# Patient Record
Sex: MALE | Race: WHITE | Employment: OTHER | URBAN - METROPOLITAN AREA
[De-identification: names, ages, dates, MRNs, and addresses within clinical notes are randomized per-mention and may not be internally consistent; named-entity substitution may affect disease eponyms.]

---

## 2017-08-30 ENCOUNTER — HOSPITAL ENCOUNTER (OUTPATIENT)
Dept: LAB | Facility: HOSPITAL | Age: 67
Discharge: HOME | End: 2017-08-30
Attending: INTERNAL MEDICINE
Payer: MEDICARE

## 2017-08-30 DIAGNOSIS — E78.5 HYPERLIPIDEMIA LDL GOAL <70: ICD-10-CM

## 2017-08-30 DIAGNOSIS — I25.10 CORONARY ARTERY DISEASE INVOLVING NATIVE CORONARY ARTERY OF NATIVE HEART WITHOUT ANGINA PECTORIS: ICD-10-CM

## 2017-08-30 LAB
ALBUMIN SERPL-MCNC: 4 G/DL (ref 3.5–4.8)
ALP LIVER SERPL-CCNC: 87 U/L (ref 45–117)
ALT SERPL-CCNC: 29 U/L (ref 17–63)
AST SERPL-CCNC: 15 U/L (ref 15–41)
BILIRUB DIRECT SERPL-MCNC: 0.2 MG/DL (ref 0.1–0.5)
BILIRUB SERPL-MCNC: 0.7 MG/DL (ref 0.1–2)
CHOLEST SMN-MCNC: 108 MG/DL (ref ?–200)
HDLC SERPL-MCNC: 54 MG/DL (ref 45–?)
HDLC SERPL: 2 {RATIO} (ref ?–4.97)
LDLC SERPL CALC-MCNC: 31 MG/DL (ref ?–130)
LDLC SERPL-MCNC: 23 MG/DL (ref 5–40)
M PROTEIN MFR SERPL ELPH: 7.6 G/DL (ref 6.1–8.3)
NONHDLC SERPL-MCNC: 54 MG/DL (ref ?–130)
TRIGLYCERIDES: 113 MG/DL (ref ?–150)

## 2017-08-30 PROCEDURE — 80076 HEPATIC FUNCTION PANEL: CPT | Performed by: INTERNAL MEDICINE

## 2017-08-30 PROCEDURE — 36415 COLL VENOUS BLD VENIPUNCTURE: CPT | Performed by: INTERNAL MEDICINE

## 2017-08-30 PROCEDURE — 80061 LIPID PANEL: CPT | Performed by: INTERNAL MEDICINE

## 2017-12-11 ENCOUNTER — HOSPITAL ENCOUNTER (OUTPATIENT)
Dept: LAB | Facility: HOSPITAL | Age: 67
Discharge: HOME | End: 2017-12-11
Attending: INTERNAL MEDICINE
Payer: MEDICARE

## 2017-12-11 DIAGNOSIS — Z95.5 S/P CORONARY ARTERY STENT PLACEMENT: ICD-10-CM

## 2017-12-11 DIAGNOSIS — I25.10 CORONARY ARTERY DISEASE INVOLVING NATIVE CORONARY ARTERY OF NATIVE HEART WITHOUT ANGINA PECTORIS: ICD-10-CM

## 2017-12-11 DIAGNOSIS — E78.5 HYPERLIPIDEMIA LDL GOAL <70: ICD-10-CM

## 2017-12-11 PROCEDURE — 80061 LIPID PANEL: CPT

## 2017-12-11 PROCEDURE — 80076 HEPATIC FUNCTION PANEL: CPT

## 2017-12-11 PROCEDURE — 36415 COLL VENOUS BLD VENIPUNCTURE: CPT

## 2018-05-18 ENCOUNTER — HOSPITAL ENCOUNTER (OUTPATIENT)
Dept: LAB | Facility: HOSPITAL | Age: 68
Discharge: HOME | End: 2018-05-18
Attending: ANESTHESIOLOGY
Payer: MEDICARE

## 2018-05-18 DIAGNOSIS — Z95.5 S/P CORONARY ARTERY STENT PLACEMENT: ICD-10-CM

## 2018-05-18 DIAGNOSIS — I25.10 CORONARY ARTERY DISEASE INVOLVING NATIVE CORONARY ARTERY OF NATIVE HEART WITHOUT ANGINA PECTORIS: ICD-10-CM

## 2018-05-18 DIAGNOSIS — E78.5 HYPERLIPIDEMIA LDL GOAL <70: ICD-10-CM

## 2018-05-18 PROCEDURE — 80061 LIPID PANEL: CPT

## 2018-05-18 PROCEDURE — 80076 HEPATIC FUNCTION PANEL: CPT

## 2018-05-18 PROCEDURE — 36415 COLL VENOUS BLD VENIPUNCTURE: CPT

## 2018-07-20 PROBLEM — I10 ESSENTIAL HYPERTENSION: Status: ACTIVE | Noted: 2018-07-20

## 2018-10-21 ENCOUNTER — HOSPITAL ENCOUNTER (EMERGENCY)
Facility: HOSPITAL | Age: 68
Discharge: HOME | End: 2018-10-21
Attending: EMERGENCY MEDICINE
Payer: MEDICARE

## 2018-10-21 ENCOUNTER — APPOINTMENT (OUTPATIENT)
Dept: GENERAL RADIOLOGY | Facility: HOSPITAL | Age: 68
End: 2018-10-21
Attending: EMERGENCY MEDICINE
Payer: MEDICARE

## 2018-10-21 VITALS
RESPIRATION RATE: 18 BRPM | TEMPERATURE: 99 F | SYSTOLIC BLOOD PRESSURE: 153 MMHG | HEIGHT: 70 IN | DIASTOLIC BLOOD PRESSURE: 97 MMHG | HEART RATE: 80 BPM | WEIGHT: 195 LBS | BODY MASS INDEX: 27.92 KG/M2 | OXYGEN SATURATION: 95 %

## 2018-10-21 DIAGNOSIS — R04.0 EPISTAXIS: Primary | ICD-10-CM

## 2018-10-21 PROCEDURE — 36415 COLL VENOUS BLD VENIPUNCTURE: CPT

## 2018-10-21 PROCEDURE — 86900 BLOOD TYPING SEROLOGIC ABO: CPT | Performed by: EMERGENCY MEDICINE

## 2018-10-21 PROCEDURE — 85025 COMPLETE CBC W/AUTO DIFF WBC: CPT | Performed by: EMERGENCY MEDICINE

## 2018-10-21 PROCEDURE — 99284 EMERGENCY DEPT VISIT MOD MDM: CPT

## 2018-10-21 PROCEDURE — 71045 X-RAY EXAM CHEST 1 VIEW: CPT | Performed by: EMERGENCY MEDICINE

## 2018-10-21 PROCEDURE — 86901 BLOOD TYPING SEROLOGIC RH(D): CPT | Performed by: EMERGENCY MEDICINE

## 2018-10-21 PROCEDURE — 85610 PROTHROMBIN TIME: CPT | Performed by: EMERGENCY MEDICINE

## 2018-10-21 PROCEDURE — 85730 THROMBOPLASTIN TIME PARTIAL: CPT | Performed by: EMERGENCY MEDICINE

## 2018-10-21 PROCEDURE — 99283 EMERGENCY DEPT VISIT LOW MDM: CPT

## 2018-10-21 PROCEDURE — 80053 COMPREHEN METABOLIC PANEL: CPT | Performed by: EMERGENCY MEDICINE

## 2018-10-21 PROCEDURE — 86850 RBC ANTIBODY SCREEN: CPT | Performed by: EMERGENCY MEDICINE

## 2018-10-21 PROCEDURE — 82272 OCCULT BLD FECES 1-3 TESTS: CPT

## 2018-10-21 NOTE — ED INITIAL ASSESSMENT (HPI)
Pt here with c/o bloody emesis X 3 episodes, patient states having throat pain and discomfort swallowing after having a dislodged piece of turkey, after the first episode of bloody emesis.  Patient breathing unlabored, even, no acute respiratory distress no

## 2018-10-22 NOTE — ED PROVIDER NOTES
Patient Seen in: [facility] Emergency Department    History   Patient presents with:  Bleeding (hematologic)  Nausea/Vomiting/Diarrhea (gastrointestinal)    Stated Complaint: hematemesis    HPI    Patient is a 75-year-old male presents to emergency Pulse 80   Temp 98.8 °F (37.1 °C) (Temporal)   Resp 18   Ht 177.8 cm (5' 10\")   Wt 88.5 kg   SpO2 95%   BMI 27.98 kg/m²         Physical Exam    GENERAL: No acute distress, well appearing and non-toxic, Alert and oriented X 3   HEENT: Normocephalic, atr Abnormality         Status                     ---------                               -----------         ------                     CBC W/ DIFFERENTIAL[[id number]]          Abnormal            Final result                 Please view results fo MDM   Patient is a 58-year-old male comes emergency room for evaluation of spitting up blood. Patient x-ray shows no acute pulmonary process. No masses. Hemoglobin 17 actually hemoconcentrated. Creatinine slightly elevated.   Recommend he push f

## 2018-10-31 ENCOUNTER — ANESTHESIA EVENT (OUTPATIENT)
Dept: SURGERY | Facility: HOSPITAL | Age: 68
End: 2018-10-31
Payer: MEDICARE

## 2018-10-31 ENCOUNTER — ANESTHESIA (OUTPATIENT)
Dept: SURGERY | Facility: HOSPITAL | Age: 68
End: 2018-10-31
Payer: MEDICARE

## 2018-10-31 ENCOUNTER — HOSPITAL ENCOUNTER (OUTPATIENT)
Facility: HOSPITAL | Age: 68
Setting detail: HOSPITAL OUTPATIENT SURGERY
Discharge: HOME | End: 2018-10-31
Attending: OTOLARYNGOLOGY | Admitting: OTOLARYNGOLOGY
Payer: MEDICARE

## 2018-10-31 VITALS
RESPIRATION RATE: 18 BRPM | DIASTOLIC BLOOD PRESSURE: 77 MMHG | HEART RATE: 52 BPM | SYSTOLIC BLOOD PRESSURE: 113 MMHG | WEIGHT: 192 LBS | OXYGEN SATURATION: 100 % | BODY MASS INDEX: 27.49 KG/M2 | HEIGHT: 70 IN | TEMPERATURE: 98 F

## 2018-10-31 DIAGNOSIS — K14.8 TONGUE MASS: ICD-10-CM

## 2018-10-31 PROCEDURE — 0CBM8ZX EXCISION OF PHARYNX, VIA NATURAL OR ARTIFICIAL OPENING ENDOSCOPIC, DIAGNOSTIC: ICD-10-PCS | Performed by: OTOLARYNGOLOGY

## 2018-10-31 PROCEDURE — 88342 IMHCHEM/IMCYTCHM 1ST ANTB: CPT | Performed by: OTOLARYNGOLOGY

## 2018-10-31 PROCEDURE — 88305 TISSUE EXAM BY PATHOLOGIST: CPT | Performed by: OTOLARYNGOLOGY

## 2018-10-31 RX ORDER — ACETAMINOPHEN 500 MG
1000 TABLET ORAL ONCE
Status: DISCONTINUED | OUTPATIENT
Start: 2018-10-31 | End: 2018-10-31 | Stop reason: HOSPADM

## 2018-10-31 RX ORDER — NALOXONE HYDROCHLORIDE 0.4 MG/ML
80 INJECTION, SOLUTION INTRAMUSCULAR; INTRAVENOUS; SUBCUTANEOUS AS NEEDED
Status: DISCONTINUED | OUTPATIENT
Start: 2018-10-31 | End: 2018-10-31

## 2018-10-31 RX ORDER — LIDOCAINE HYDROCHLORIDE AND EPINEPHRINE 10; 10 MG/ML; UG/ML
INJECTION, SOLUTION INFILTRATION; PERINEURAL AS NEEDED
Status: DISCONTINUED | OUTPATIENT
Start: 2018-10-31 | End: 2018-10-31 | Stop reason: HOSPADM

## 2018-10-31 RX ORDER — MIDAZOLAM HYDROCHLORIDE 1 MG/ML
1 INJECTION INTRAMUSCULAR; INTRAVENOUS EVERY 5 MIN PRN
Status: DISCONTINUED | OUTPATIENT
Start: 2018-10-31 | End: 2018-10-31

## 2018-10-31 RX ORDER — SODIUM CHLORIDE, SODIUM LACTATE, POTASSIUM CHLORIDE, CALCIUM CHLORIDE 600; 310; 30; 20 MG/100ML; MG/100ML; MG/100ML; MG/100ML
INJECTION, SOLUTION INTRAVENOUS CONTINUOUS
Status: DISCONTINUED | OUTPATIENT
Start: 2018-10-31 | End: 2018-10-31

## 2018-10-31 RX ORDER — ACETAMINOPHEN AND CODEINE PHOSPHATE 300; 30 MG/1; MG/1
1-2 TABLET ORAL EVERY 4 HOURS PRN
Qty: 25 TABLET | Refills: 0 | Status: SHIPPED | OUTPATIENT
Start: 2018-10-31 | End: 2018-11-10

## 2018-10-31 RX ORDER — MORPHINE SULFATE 4 MG/ML
2 INJECTION, SOLUTION INTRAMUSCULAR; INTRAVENOUS EVERY 5 MIN PRN
Status: DISCONTINUED | OUTPATIENT
Start: 2018-10-31 | End: 2018-10-31

## 2018-10-31 RX ORDER — HYDROCODONE BITARTRATE AND ACETAMINOPHEN 5; 325 MG/1; MG/1
2 TABLET ORAL AS NEEDED
Status: DISCONTINUED | OUTPATIENT
Start: 2018-10-31 | End: 2018-10-31

## 2018-10-31 RX ORDER — CEFAZOLIN SODIUM/WATER 2 G/20 ML
2 SYRINGE (ML) INTRAVENOUS ONCE
Status: DISCONTINUED | OUTPATIENT
Start: 2018-10-31 | End: 2018-10-31 | Stop reason: HOSPADM

## 2018-10-31 RX ORDER — DEXAMETHASONE SODIUM PHOSPHATE 4 MG/ML
8 VIAL (ML) INJECTION AS NEEDED
Status: DISCONTINUED | OUTPATIENT
Start: 2018-10-31 | End: 2018-10-31

## 2018-10-31 RX ORDER — ACETAMINOPHEN 500 MG
1000 TABLET ORAL ONCE
COMMUNITY
End: 2018-11-13 | Stop reason: ALTCHOICE

## 2018-10-31 RX ORDER — ONDANSETRON 2 MG/ML
4 INJECTION INTRAMUSCULAR; INTRAVENOUS AS NEEDED
Status: DISCONTINUED | OUTPATIENT
Start: 2018-10-31 | End: 2018-10-31

## 2018-10-31 RX ORDER — METOCLOPRAMIDE HYDROCHLORIDE 5 MG/ML
10 INJECTION INTRAMUSCULAR; INTRAVENOUS AS NEEDED
Status: DISCONTINUED | OUTPATIENT
Start: 2018-10-31 | End: 2018-10-31

## 2018-10-31 RX ORDER — HYDROCODONE BITARTRATE AND ACETAMINOPHEN 5; 325 MG/1; MG/1
1 TABLET ORAL AS NEEDED
Status: DISCONTINUED | OUTPATIENT
Start: 2018-10-31 | End: 2018-10-31

## 2018-10-31 RX ORDER — MEPERIDINE HYDROCHLORIDE 25 MG/ML
12.5 INJECTION INTRAMUSCULAR; INTRAVENOUS; SUBCUTANEOUS AS NEEDED
Status: DISCONTINUED | OUTPATIENT
Start: 2018-10-31 | End: 2018-10-31

## 2018-10-31 NOTE — ANESTHESIA POSTPROCEDURE EVALUATION
[place]  Patient Status:  Hospital Outpatient Surgery   Age/Gender 79year old male MRN [id number]   [facility] SURGERY Attending Che Mares MD   Hosp Day # 0 PCP Vitor Lobato MD       Anesthesia Pos

## 2018-10-31 NOTE — OPERATIVE REPORT
[facility]    PATIENT'S NAME: Valerio Cummings   ATTENDING PHYSICIAN: Keshia Jay M.D. OPERATING PHYSICIAN: Keshia Jay M.D.    PATIENT ACCOUNT#:   [de-identified]    LOCATION:  PACU 1404 [facility] PACU 4 EDWP 10  MEDICAL RECORD #:   [id number]       DATE OF HERNANDEZ

## 2018-10-31 NOTE — ANESTHESIA PREPROCEDURE EVALUATION
PRE-OP EVALUATION    Patient Name: Alvera Factor.    Pre-op Diagnosis: Tongue mass [R22.0]    Procedure(s):  DIRECT LARYNGOSCOPY WITH BIOPSY     Surgeon(s) and Role:     Karla Sandoval MD - Primary    Pre-op vitals reviewed.   Temp: 97.5 °F (36.4 °C) TONSILLECTOMY       Social History    Tobacco Use      Smoking status: Never Smoker      Smokeless tobacco: Never Used    Alcohol use: Yes      Alcohol/week: 1.2 oz      Types: 2 Cans of beer per week      Drug use: No     Available pre-op labs reviewed. Admission:  **None**

## 2018-10-31 NOTE — DISCHARGE SUMMARY
Patient underwent direct laryngoscopy and biopsy without problems and discharged home in good condition.

## 2018-11-13 PROBLEM — C02.9 TONGUE CANCER (HCC): Status: ACTIVE | Noted: 2018-11-13

## 2018-12-07 ENCOUNTER — APPOINTMENT (OUTPATIENT)
Dept: SPEECH THERAPY | Age: 68
End: 2018-12-07
Attending: RADIOLOGY
Payer: MEDICARE

## 2018-12-12 ENCOUNTER — HOSPITAL ENCOUNTER (OUTPATIENT)
Dept: GENERAL RADIOLOGY | Facility: HOSPITAL | Age: 68
Discharge: HOME | End: 2018-12-12
Attending: RADIOLOGY
Payer: MEDICARE

## 2018-12-12 DIAGNOSIS — C02.9 TONGUE CANCER (HCC): ICD-10-CM

## 2018-12-12 PROCEDURE — 74230 X-RAY XM SWLNG FUNCJ C+: CPT | Performed by: RADIOLOGY

## 2018-12-12 PROCEDURE — 92611 MOTION FLUOROSCOPY/SWALLOW: CPT

## 2018-12-12 NOTE — PROGRESS NOTES
ADULT VIDEOFLUOROSCOPIC SWALLOWING STUDY    Admission Date: 12/12/2018  Evaluation Date: 12/12/18  Radiologist: Dr. Antonio Madrigal: Regular  Diet Recommendations - Liquid:  Thin    Further Follow-up:  Follow Up Ne swallows  Effectiveness: Yes  NECTAR THICK LIQUIDS  Method of Presentation: Cup  Oral Phase of Swallow (VFSS - Nectar Thick Liquids):  Within Functional Limits  Triggered at: Base of tongue  Residue Severity, Location: Trace;Valleculae;Posterior pharyngeal related to prophylactic exercises to be delineated by the outpatient therapist.     Not Addressed     PLAN:   Outpatient therapy for prophylactic exercises and education related to swallowing function with radiation and chemotherapy.      EDUCATION/INSTRUCT

## 2018-12-17 ENCOUNTER — OFFICE VISIT (OUTPATIENT)
Dept: SPEECH THERAPY | Age: 68
End: 2018-12-17
Attending: RADIOLOGY
Payer: MEDICARE

## 2018-12-17 PROCEDURE — 92610 EVALUATE SWALLOWING FUNCTION: CPT

## 2018-12-17 PROCEDURE — 92526 ORAL FUNCTION THERAPY: CPT

## 2018-12-17 NOTE — PROGRESS NOTES
HEAD AND NECK SWALLOWING EVALUATION  Referring Physician: Dr. Gildardo Louis,   Diagnosis: Dysphagia R13.12     Date of Service: 12/17/2018     PATIENT Michell Navarrete is a 76year old y/o male who presents to therapy today with tonsil CA and upco visit. Swallowing History  Current Swallowing Diagnosis: odynophagia and xerostomia   Swallowing History: patient reports odynophagia and xerostomia post surgical biopsy. No swallowing difficulty noted prior to that.      ASSESSMENT  Patient recently patient in one month to discuss exercises, treatment, and determine if further therapy is warranted at that time. Goals:    1: Patient will complete HEP as directed by therapist with 100% compliance during radiation therapy.    2: Patient will demonstra

## 2018-12-27 ENCOUNTER — HOSPITAL (OUTPATIENT)
Dept: OTHER | Age: 68
End: 2018-12-27
Attending: SURGERY

## 2019-02-07 ENCOUNTER — TELEPHONE (OUTPATIENT)
Dept: SPEECH THERAPY | Age: 69
End: 2019-02-07

## 2019-03-08 ENCOUNTER — TELEPHONE (OUTPATIENT)
Dept: SCHEDULING | Age: 69
End: 2019-03-08

## 2019-10-30 ENCOUNTER — HOSPITAL ENCOUNTER (OUTPATIENT)
Dept: LAB | Facility: HOSPITAL | Age: 69
Discharge: HOME | End: 2019-10-30
Attending: INTERNAL MEDICINE
Payer: MEDICARE

## 2019-10-30 DIAGNOSIS — E87.5 HYPERKALEMIA: ICD-10-CM

## 2019-10-30 PROCEDURE — 80048 BASIC METABOLIC PNL TOTAL CA: CPT

## 2019-10-30 PROCEDURE — 36415 COLL VENOUS BLD VENIPUNCTURE: CPT

## 2019-10-30 PROCEDURE — 83735 ASSAY OF MAGNESIUM: CPT

## 2019-10-30 NOTE — PROGRESS NOTES
Please inform potassium is now normal  We can continue to monitor and repeat at his check ups  No further w/u needed.  This was likely lab erorr

## 2020-11-10 PROBLEM — J20.9 ACUTE BRONCHITIS: Status: ACTIVE | Noted: 2017-03-31

## 2021-05-19 PROBLEM — D70.1 CHEMOTHERAPY INDUCED NEUTROPENIA (HCC): Status: ACTIVE | Noted: 2021-05-19

## 2021-05-19 PROBLEM — J20.9 ACUTE BRONCHITIS: Status: RESOLVED | Noted: 2017-03-31 | Resolved: 2021-05-19

## 2021-05-19 PROBLEM — T45.1X5A CHEMOTHERAPY INDUCED NEUTROPENIA: Status: ACTIVE | Noted: 2021-05-19

## 2021-05-19 PROBLEM — D69.6 THROMBOCYTOPENIA (HCC): Status: ACTIVE | Noted: 2021-05-19

## 2021-05-19 PROBLEM — T45.1X5A CHEMOTHERAPY INDUCED NEUTROPENIA (HCC): Status: ACTIVE | Noted: 2021-05-19

## 2021-05-19 PROBLEM — D70.1 CHEMOTHERAPY INDUCED NEUTROPENIA: Status: ACTIVE | Noted: 2021-05-19

## 2021-05-19 PROBLEM — D69.6 THROMBOCYTOPENIA: Status: ACTIVE | Noted: 2021-05-19

## 2021-07-14 ENCOUNTER — HOSPITAL ENCOUNTER (OUTPATIENT)
Dept: LAB | Facility: HOSPITAL | Age: 71
Discharge: HOME | End: 2021-07-14
Attending: INTERNAL MEDICINE
Payer: MEDICARE

## 2021-07-14 DIAGNOSIS — I10 ESSENTIAL HYPERTENSION: ICD-10-CM

## 2021-07-14 DIAGNOSIS — E78.5 HYPERLIPIDEMIA LDL GOAL <70: ICD-10-CM

## 2021-07-14 DIAGNOSIS — Z95.5 S/P CORONARY ARTERY STENT PLACEMENT: ICD-10-CM

## 2021-07-14 LAB
ALBUMIN SERPL-MCNC: 4.1 G/DL (ref 3.4–5)
ALP LIVER SERPL-CCNC: 92 U/L
ALT SERPL-CCNC: 30 U/L
AST SERPL-CCNC: 22 U/L (ref 15–37)
BILIRUB DIRECT SERPL-MCNC: 0.2 MG/DL (ref 0–0.2)
BILIRUB SERPL-MCNC: 0.6 MG/DL (ref 0.1–2)
CHOLEST SMN-MCNC: 121 MG/DL (ref ?–200)
HDLC SERPL-MCNC: 71 MG/DL (ref 40–59)
LDLC SERPL CALC-MCNC: 38 MG/DL (ref ?–100)
M PROTEIN MFR SERPL ELPH: 7.8 G/DL (ref 6.4–8.2)
NONHDLC SERPL-MCNC: 50 MG/DL (ref ?–130)
PATIENT FASTING Y/N/NP: YES
TRIGL SERPL-MCNC: 50 MG/DL (ref 30–149)
VLDLC SERPL CALC-MCNC: 7 MG/DL (ref 0–30)

## 2021-07-14 PROCEDURE — 36415 COLL VENOUS BLD VENIPUNCTURE: CPT

## 2021-07-14 PROCEDURE — 80061 LIPID PANEL: CPT

## 2021-07-14 PROCEDURE — 80076 HEPATIC FUNCTION PANEL: CPT

## 2021-10-30 ENCOUNTER — APPOINTMENT (OUTPATIENT)
Dept: CT IMAGING | Facility: HOSPITAL | Age: 71
End: 2021-10-30
Attending: EMERGENCY MEDICINE
Payer: MEDICARE

## 2021-10-30 ENCOUNTER — HOSPITAL ENCOUNTER (EMERGENCY)
Facility: HOSPITAL | Age: 71
Discharge: HOME | End: 2021-10-30
Attending: EMERGENCY MEDICINE
Payer: MEDICARE

## 2021-10-30 VITALS
DIASTOLIC BLOOD PRESSURE: 88 MMHG | HEART RATE: 67 BPM | HEIGHT: 68 IN | OXYGEN SATURATION: 96 % | TEMPERATURE: 99 F | BODY MASS INDEX: 25.76 KG/M2 | RESPIRATION RATE: 18 BRPM | WEIGHT: 170 LBS | SYSTOLIC BLOOD PRESSURE: 138 MMHG

## 2021-10-30 DIAGNOSIS — N30.90 CYSTITIS: ICD-10-CM

## 2021-10-30 DIAGNOSIS — R31.0 GROSS HEMATURIA: Primary | ICD-10-CM

## 2021-10-30 PROCEDURE — 36415 COLL VENOUS BLD VENIPUNCTURE: CPT

## 2021-10-30 PROCEDURE — 80053 COMPREHEN METABOLIC PANEL: CPT | Performed by: EMERGENCY MEDICINE

## 2021-10-30 PROCEDURE — 87086 URINE CULTURE/COLONY COUNT: CPT | Performed by: EMERGENCY MEDICINE

## 2021-10-30 PROCEDURE — 99284 EMERGENCY DEPT VISIT MOD MDM: CPT

## 2021-10-30 PROCEDURE — 85025 COMPLETE CBC W/AUTO DIFF WBC: CPT | Performed by: EMERGENCY MEDICINE

## 2021-10-30 PROCEDURE — 81001 URINALYSIS AUTO W/SCOPE: CPT

## 2021-10-30 PROCEDURE — 81001 URINALYSIS AUTO W/SCOPE: CPT | Performed by: EMERGENCY MEDICINE

## 2021-10-30 PROCEDURE — 74176 CT ABD & PELVIS W/O CONTRAST: CPT | Performed by: EMERGENCY MEDICINE

## 2021-10-30 RX ORDER — CIPROFLOXACIN 250 MG/1
250 TABLET, FILM COATED ORAL 2 TIMES DAILY
Qty: 6 TABLET | Refills: 0 | Status: SHIPPED | OUTPATIENT
Start: 2021-10-30 | End: 2021-11-02

## 2021-10-30 NOTE — ED PROVIDER NOTES
Patient Seen in: [facility] Emergency Department      History   Patient presents with:  Eval-G    Stated Complaint: hematuria/dysuria    Subjective:   HPI    68-year-old male presents emergency department who was noted to have some hematuria today. transmission during my interaction with the patient were taken. The patient was already wearing a droplet mask on my arrival to the room.  Personal protective equipment including droplet mask, eye protection, and gloves were worn throughout the duration of (*)     Monocyte Absolute 1.13 (*)     All other components within normal limits   CBC WITH DIFFERENTIAL WITH PLATELET    Narrative: The following orders were created for panel order CBC With Differential With Platelet.   Procedure BILIARY:  No visible dilatation or calcification. PANCREAS:  No lesion, fluid collection, ductal dilatation, or atrophy. SPLEEN:  No enlargement or focal lesion. AORTA/VASCULAR:  No aneurysm. RETROPERITONEUM:  No mass or adenopathy.   BOWEL/MESENTERY: not or was no longer present. There was no indication for further evaluation, treatment, or admission on an emergency basis. Comprehensive verbal and written discharge and follow-up instructions were provided to help prevent relapse or worsening.   Iain

## 2021-11-02 PROBLEM — R35.0 BENIGN PROSTATIC HYPERPLASIA WITH URINARY FREQUENCY: Status: ACTIVE | Noted: 2021-11-02

## 2021-11-02 PROBLEM — N40.1 BENIGN PROSTATIC HYPERPLASIA WITH URINARY FREQUENCY: Status: ACTIVE | Noted: 2021-11-02

## 2022-07-09 ENCOUNTER — HOSPITAL ENCOUNTER (EMERGENCY)
Facility: HOSPITAL | Age: 72
Discharge: HOME | End: 2022-07-09
Attending: EMERGENCY MEDICINE
Payer: MEDICARE

## 2022-07-09 VITALS
SYSTOLIC BLOOD PRESSURE: 146 MMHG | TEMPERATURE: 98 F | BODY MASS INDEX: 25.18 KG/M2 | HEIGHT: 69 IN | WEIGHT: 170 LBS | DIASTOLIC BLOOD PRESSURE: 86 MMHG | HEART RATE: 70 BPM | OXYGEN SATURATION: 95 % | RESPIRATION RATE: 18 BRPM

## 2022-07-09 DIAGNOSIS — R31.0 GROSS HEMATURIA: Primary | ICD-10-CM

## 2022-07-09 LAB
GLUCOSE UR STRIP.AUTO-MCNC: 100 MG/DL
KETONES UR STRIP.AUTO-MCNC: 40 MG/DL
NITRITE UR QL STRIP.AUTO: POSITIVE
PH UR STRIP.AUTO: 6 [PH] (ref 5–8)
PROT UR STRIP.AUTO-MCNC: >=300 MG/DL
RBC #/AREA URNS AUTO: >10 /HPF
SP GR UR STRIP.AUTO: 1.01 (ref 1–1.03)
UROBILINOGEN UR STRIP.AUTO-MCNC: 4 MG/DL

## 2022-07-09 PROCEDURE — 99283 EMERGENCY DEPT VISIT LOW MDM: CPT

## 2022-07-09 PROCEDURE — 87086 URINE CULTURE/COLONY COUNT: CPT | Performed by: EMERGENCY MEDICINE

## 2022-07-09 PROCEDURE — 81001 URINALYSIS AUTO W/SCOPE: CPT

## 2022-07-09 PROCEDURE — 81015 MICROSCOPIC EXAM OF URINE: CPT

## 2022-07-09 PROCEDURE — 81001 URINALYSIS AUTO W/SCOPE: CPT | Performed by: EMERGENCY MEDICINE

## 2022-07-09 RX ORDER — CIPROFLOXACIN 500 MG/1
500 TABLET, FILM COATED ORAL 2 TIMES DAILY
Qty: 20 TABLET | Refills: 0 | Status: SHIPPED | OUTPATIENT
Start: 2022-07-09 | End: 2022-07-19

## 2022-07-09 RX ORDER — PHENAZOPYRIDINE HYDROCHLORIDE 200 MG/1
200 TABLET, FILM COATED ORAL 3 TIMES DAILY PRN
Qty: 6 TABLET | Refills: 0 | Status: SHIPPED | OUTPATIENT
Start: 2022-07-09 | End: 2022-07-16

## 2025-06-02 ENCOUNTER — APPOINTMENT (OUTPATIENT)
Dept: CT IMAGING | Facility: HOSPITAL | Age: 75
End: 2025-06-02
Attending: EMERGENCY MEDICINE
Payer: MEDICARE

## 2025-06-02 ENCOUNTER — HOSPITAL ENCOUNTER (EMERGENCY)
Facility: HOSPITAL | Age: 75
Discharge: HOME | End: 2025-06-02
Attending: EMERGENCY MEDICINE
Payer: MEDICARE

## 2025-06-02 VITALS
OXYGEN SATURATION: 97 % | DIASTOLIC BLOOD PRESSURE: 87 MMHG | RESPIRATION RATE: 16 BRPM | WEIGHT: 172 LBS | SYSTOLIC BLOOD PRESSURE: 149 MMHG | HEIGHT: 66.5 IN | TEMPERATURE: 98 F | BODY MASS INDEX: 27.32 KG/M2 | HEART RATE: 72 BPM
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
## 2025-06-02 DIAGNOSIS — S00.83XA CONTUSION OF FACE, INITIAL ENCOUNTER: ICD-10-CM

## 2025-06-02 DIAGNOSIS — S01.81XA FACIAL LACERATION, INITIAL ENCOUNTER: Primary | ICD-10-CM

## 2025-06-02 PROCEDURE — 99284 EMERGENCY DEPT VISIT MOD MDM: CPT

## 2025-06-02 PROCEDURE — 12013 RPR F/E/E/N/L/M 2.6-5.0 CM: CPT

## 2025-06-02 PROCEDURE — 70486 CT MAXILLOFACIAL W/O DYE: CPT | Performed by: EMERGENCY MEDICINE

## 2025-06-02 PROCEDURE — 90471 IMMUNIZATION ADMIN: CPT

## 2025-06-02 PROCEDURE — 76377 3D RENDER W/INTRP POSTPROCES: CPT | Performed by: EMERGENCY MEDICINE

## 2025-06-02 PROCEDURE — 70450 CT HEAD/BRAIN W/O DYE: CPT | Performed by: EMERGENCY MEDICINE

## 2025-06-02 NOTE — ED INITIAL ASSESSMENT (HPI)
Pt tripped and fell on cord a couple hours ago, pt has lacs, swelling and redness to nose and lac to upper lip that is \"all the way through\" bandage in place and bleeding controlled, pt was seen at  and sent to ED for further eval, denies LOC, no thinners, denies other injuries
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
## 2025-06-03 NOTE — ED QUICK NOTES
Patient updated on rooming process  Re-vital'd, VSS, denies any new or worsening sx since initial triage assessment  Offered tylenol or ibuprofen for pain relief, declines at this time  RR even/NL, ambulatory w/ steady gait

## (undated) DEVICE — STANDARD HYPODERMIC NEEDLE,POLYPROPYLENE HUB: Brand: MONOJECT

## (undated) DEVICE — SPECIMEN CONTAINER,POSITIVE SEAL INDICATOR, OR PACKAGED: Brand: PRECISION

## (undated) DEVICE — SYRINGE 10ML LL CONTRL SYRINGE

## (undated) DEVICE — MEDI-VAC SUCTION FINE CAPACITY: Brand: CARDINAL HEALTH

## (undated) DEVICE — BASIC PACK: Brand: CONVERTORS

## (undated) DEVICE — GAMMEX® NON-LATEX PI TEXTURED SIZE 7, STERILE POLYISOPRENE POWDER-FREE SURGICAL GLOVE: Brand: GAMMEX

## (undated) DEVICE — TOWEL: OR BLU 80/CS: Brand: MEDICAL ACTION INDUSTRIES

## (undated) DEVICE — MARKER SKIN 2 TIP

## (undated) DEVICE — SPONGE RAYTEC 4X4 RF DETECT

## (undated) DEVICE — TOWEL OR BLU 16X26 STRL

## (undated) DEVICE — MEDI-VAC NON-CONDUCTIVE SUCTION TUBING: Brand: CARDINAL HEALTH

## (undated) NOTE — ED AVS SNAPSHOT
Andrea Franco. MRN: [id number]    Department:  [facility] Emergency Department   Date of Visit:  10/21/2018           Disclosure     Insurance plans vary and the physician(s) referred by the ER may not be covered by your plan.  Please contact tell this physician (or your personal doctor if your instructions are to return to your personal doctor) about any new or lasting problems. The primary care or specialist physician will see patients referred from the [facility] Emergency Department.  Eduardo Triana